# Patient Record
Sex: MALE | Race: WHITE | NOT HISPANIC OR LATINO | Employment: OTHER | URBAN - METROPOLITAN AREA
[De-identification: names, ages, dates, MRNs, and addresses within clinical notes are randomized per-mention and may not be internally consistent; named-entity substitution may affect disease eponyms.]

---

## 2019-02-05 ENCOUNTER — TELEPHONE (OUTPATIENT)
Dept: VASCULAR SURGERY | Facility: CLINIC | Age: 84
End: 2019-02-05

## 2019-02-05 NOTE — TELEPHONE ENCOUNTER
----- Message from Teresa Snell sent at 2/5/2019 10:58 AM CST -----  Contact: Annie/Tess  ..Type: Needs Medical Advice    Who Called: Katey  Best Call Back Number:   Additional Information:Pt would like to be seen By Dr Guardado to monitor his (Abdominal Aortic Aneurism and a Thoracic Aortic Aneurism, Pt just moved here from [place] and would like Dr Guardado monitor his conditions.  Please call to advise  Thanks

## 2019-02-06 ENCOUNTER — TELEPHONE (OUTPATIENT)
Dept: VASCULAR SURGERY | Facility: CLINIC | Age: 84
End: 2019-02-06

## 2019-02-06 NOTE — TELEPHONE ENCOUNTER
----- Message from Vania Staley sent at 2/6/2019  4:17 PM CST -----  Contact: Daughter Tess Armando  Patients daughter spoke to you yesterday about getting her father in to see the doctor for his aneurysms.  You told her that the doctor would need test results to prove that he has them and they found those results today.  Call back to let her know how she can get them to you and get her father scheduled.  Call Tess back at [phone].  Thanks

## 2019-02-14 ENCOUNTER — OFFICE VISIT (OUTPATIENT)
Dept: VASCULAR SURGERY | Facility: CLINIC | Age: 84
End: 2019-02-14
Payer: MEDICARE

## 2019-02-14 VITALS
DIASTOLIC BLOOD PRESSURE: 80 MMHG | BODY MASS INDEX: 28.01 KG/M2 | SYSTOLIC BLOOD PRESSURE: 149 MMHG | WEIGHT: 189.13 LBS | HEART RATE: 64 BPM | HEIGHT: 69 IN

## 2019-02-14 DIAGNOSIS — I71.20 THORACIC AORTIC ANEURYSM WITHOUT RUPTURE: ICD-10-CM

## 2019-02-14 DIAGNOSIS — I71.40 ABDOMINAL AORTIC ANEURYSM (AAA) WITHOUT RUPTURE: ICD-10-CM

## 2019-02-14 DIAGNOSIS — I71.20 THORACIC AORTIC ANEURYSM WITHOUT RUPTURE: Primary | ICD-10-CM

## 2019-02-14 PROCEDURE — 99203 OFFICE O/P NEW LOW 30 MIN: CPT | Mod: S$PBB,,, | Performed by: THORACIC SURGERY (CARDIOTHORACIC VASCULAR SURGERY)

## 2019-02-14 PROCEDURE — 99203 PR OFFICE/OUTPT VISIT, NEW, LEVL III, 30-44 MIN: ICD-10-PCS | Mod: S$PBB,,, | Performed by: THORACIC SURGERY (CARDIOTHORACIC VASCULAR SURGERY)

## 2019-02-14 PROCEDURE — 99999 PR PBB SHADOW E&M-EST. PATIENT-LVL III: CPT | Mod: PBBFAC,,, | Performed by: THORACIC SURGERY (CARDIOTHORACIC VASCULAR SURGERY)

## 2019-02-14 PROCEDURE — 99213 OFFICE O/P EST LOW 20 MIN: CPT | Mod: PBBFAC,PO | Performed by: THORACIC SURGERY (CARDIOTHORACIC VASCULAR SURGERY)

## 2019-02-14 PROCEDURE — 99999 PR PBB SHADOW E&M-EST. PATIENT-LVL III: ICD-10-PCS | Mod: PBBFAC,,, | Performed by: THORACIC SURGERY (CARDIOTHORACIC VASCULAR SURGERY)

## 2019-02-14 RX ORDER — DOXAZOSIN 2 MG/1
TABLET ORAL
Refills: 2 | COMMUNITY
Start: 2018-11-19 | End: 2020-01-08

## 2019-02-14 RX ORDER — ASCORBIC ACID 500 MG
500 TABLET ORAL DAILY
Status: ON HOLD | COMMUNITY
End: 2021-01-01

## 2019-02-14 RX ORDER — LANOLIN ALCOHOL/MO/W.PET/CERES
1000 CREAM (GRAM) TOPICAL DAILY
Status: ON HOLD | COMMUNITY
End: 2021-01-01 | Stop reason: HOSPADM

## 2019-02-14 RX ORDER — INSULIN GLARGINE 100 [IU]/ML
INJECTION, SOLUTION SUBCUTANEOUS
Refills: 2 | COMMUNITY
Start: 2018-12-24 | End: 2019-09-13 | Stop reason: CLARIF

## 2019-02-14 RX ORDER — ASPIRIN 81 MG/1
162 TABLET ORAL DAILY
COMMUNITY
End: 2019-09-13

## 2019-02-14 RX ORDER — ATORVASTATIN CALCIUM 20 MG/1
20 TABLET, FILM COATED ORAL NIGHTLY
Refills: 2 | Status: ON HOLD | COMMUNITY
Start: 2018-11-19 | End: 2021-01-01 | Stop reason: HOSPADM

## 2019-02-14 RX ORDER — CARVEDILOL 6.25 MG/1
6.25 TABLET ORAL EVERY MORNING
Status: ON HOLD | COMMUNITY
End: 2020-01-01 | Stop reason: HOSPADM

## 2019-02-15 NOTE — PROGRESS NOTES
OFFICE NOTE    HISTORY OF PRESENT ILLNESS:  This is an 89-year-old gentleman with descending   thoracic aortic and abdominal aortic aneurysms.  He had been seen at an outside   facility [place] approximately two years ago where he had a CT scan of   the chest and abdomen.  This has been followed according to the patient for many   years.  He has been totally asymptomatic.  He has mild hypertension and   diabetes.  He denies any coronary artery disease.  He has never been a smoker.    He has had no major recent surgeries.  He has had a fractured hip that was   repaired some time ago and he has recovered reasonably well from this.  He has   no other pertinent family or social history.    REVIEW OF SYSTEMS:  Fairly unremarkable for someone his age.    PHYSICAL EXAMINATION:  VITAL SIGNS:  Stable.  HEENT:  Pupils are equal, round and reactive to light.  Nose and throat are   clear.  NECK:  Supple.  CHEST:  Clear to auscultation.  HEART:  Regular rate and rhythm.  ABDOMEN:  Benign.  EXTREMITIES:  Femoral pulses are equal.  Perfusion to the legs and feet seems to   be satisfactory.    At this point, he has had scans of his chest and abdomen approximately two years   ago showing these aneurysms.  We will check a repeat CT of the chest and   abdomen to see if there is any change in aneurysm and based on this, we will   make further recommendations.      BAYLEE  dd: 02/14/2019 12:04:13 (CST)  td: 02/15/2019 00:22:20 (CST)  Doc ID   #[id number]  Job ID #[id number]    CC:

## 2019-02-18 ENCOUNTER — HOSPITAL ENCOUNTER (OUTPATIENT)
Dept: RADIOLOGY | Facility: HOSPITAL | Age: 84
Discharge: HOME | End: 2019-02-18
Attending: THORACIC SURGERY (CARDIOTHORACIC VASCULAR SURGERY)
Payer: MEDICARE

## 2019-02-18 DIAGNOSIS — I71.20 THORACIC AORTIC ANEURYSM WITHOUT RUPTURE: ICD-10-CM

## 2019-02-18 PROCEDURE — 71250 CT CHEST ABDOMEN PELVIS WITHOUT CONTRAST(XPD): ICD-10-PCS | Mod: 26,,, | Performed by: RADIOLOGY

## 2019-02-18 PROCEDURE — 74176 CT ABD & PELVIS W/O CONTRAST: CPT | Mod: TC,PO

## 2019-02-18 PROCEDURE — 74176 CT CHEST ABDOMEN PELVIS WITHOUT CONTRAST(XPD): ICD-10-PCS | Mod: 26,,, | Performed by: RADIOLOGY

## 2019-02-18 PROCEDURE — 71250 CT THORAX DX C-: CPT | Mod: 26,,, | Performed by: RADIOLOGY

## 2019-02-18 PROCEDURE — 71250 CT THORAX DX C-: CPT | Mod: TC,PO

## 2019-02-18 PROCEDURE — 74176 CT ABD & PELVIS W/O CONTRAST: CPT | Mod: 26,,, | Performed by: RADIOLOGY

## 2019-06-13 ENCOUNTER — TELEPHONE (OUTPATIENT)
Dept: VASCULAR SURGERY | Facility: CLINIC | Age: 84
End: 2019-06-13

## 2019-06-13 NOTE — TELEPHONE ENCOUNTER
----- Message from Darlyn Elkins sent at 6/13/2019  1:44 PM CDT -----  Contact: Tess Armando (Daughter)  Tess Tr (Daughter) states pt was seen 2/14/19 and they are wanting to know does he need to come back in for any follow up appointments....[phone]

## 2019-06-19 DIAGNOSIS — I71.20 THORACIC AORTIC ANEURYSM WITHOUT RUPTURE: Primary | ICD-10-CM

## 2019-06-28 ENCOUNTER — TELEPHONE (OUTPATIENT)
Dept: VASCULAR SURGERY | Facility: CLINIC | Age: 84
End: 2019-06-28

## 2019-06-28 NOTE — TELEPHONE ENCOUNTER
----- Message from Brooklyn Uriarte sent at 6/28/2019  2:17 PM CDT -----  Contact: Duong Armando [phone]  Please call her to schedule the CT. Thank you!

## 2019-07-03 ENCOUNTER — HOSPITAL ENCOUNTER (OUTPATIENT)
Dept: RADIOLOGY | Facility: HOSPITAL | Age: 84
Discharge: HOME | End: 2019-07-03
Attending: THORACIC SURGERY (CARDIOTHORACIC VASCULAR SURGERY)
Payer: MEDICARE

## 2019-07-03 DIAGNOSIS — I71.20 THORACIC AORTIC ANEURYSM WITHOUT RUPTURE: ICD-10-CM

## 2019-07-03 PROCEDURE — 71250 CT THORAX DX C-: CPT | Mod: TC,PO

## 2019-07-03 PROCEDURE — 71250 CT CHEST ABDOMEN PELVIS WITHOUT CONTRAST(XPD): ICD-10-PCS | Mod: 26,,, | Performed by: RADIOLOGY

## 2019-07-03 PROCEDURE — 74176 CT ABD & PELVIS W/O CONTRAST: CPT | Mod: TC,PO

## 2019-07-03 PROCEDURE — 71250 CT THORAX DX C-: CPT | Mod: 26,,, | Performed by: RADIOLOGY

## 2019-07-03 PROCEDURE — 74176 CT CHEST ABDOMEN PELVIS WITHOUT CONTRAST(XPD): ICD-10-PCS | Mod: 26,,, | Performed by: RADIOLOGY

## 2019-07-03 PROCEDURE — 74176 CT ABD & PELVIS W/O CONTRAST: CPT | Mod: 26,,, | Performed by: RADIOLOGY

## 2019-07-04 ENCOUNTER — PATIENT MESSAGE (OUTPATIENT)
Dept: VASCULAR SURGERY | Facility: CLINIC | Age: 84
End: 2019-07-04

## 2019-07-10 ENCOUNTER — PATIENT MESSAGE (OUTPATIENT)
Dept: VASCULAR SURGERY | Facility: CLINIC | Age: 84
End: 2019-07-10

## 2019-07-12 ENCOUNTER — TELEPHONE (OUTPATIENT)
Dept: VASCULAR SURGERY | Facility: CLINIC | Age: 84
End: 2019-07-12

## 2019-07-24 ENCOUNTER — PATIENT MESSAGE (OUTPATIENT)
Dept: VASCULAR SURGERY | Facility: CLINIC | Age: 84
End: 2019-07-24

## 2019-08-19 ENCOUNTER — LAB VISIT (OUTPATIENT)
Dept: LAB | Facility: HOSPITAL | Age: 84
End: 2019-08-19
Attending: INTERNAL MEDICINE
Payer: MEDICARE

## 2019-08-19 DIAGNOSIS — D63.1 ANEMIA OF RENAL DISEASE: ICD-10-CM

## 2019-08-19 DIAGNOSIS — N18.9 ANEMIA OF RENAL DISEASE: ICD-10-CM

## 2019-08-19 LAB
ALBUMIN SERPL BCP-MCNC: 4.4 G/DL (ref 3.5–5.2)
ALP SERPL-CCNC: 81 U/L (ref 38–145)
ALT SERPL W/O P-5'-P-CCNC: 13 U/L (ref 10–44)
ANION GAP SERPL CALC-SCNC: 7 MMOL/L (ref 8–16)
AST SERPL-CCNC: 28 U/L (ref 17–59)
BASOPHILS # BLD AUTO: 0.01 K/UL (ref 0–0.2)
BASOPHILS NFR BLD: 0.3 % (ref 0–1.9)
BILIRUB SERPL-MCNC: 0.6 MG/DL (ref 0.2–1.3)
BUN SERPL-MCNC: 32 MG/DL (ref 9–21)
CALCIUM SERPL-MCNC: 9.9 MG/DL (ref 8.4–10.2)
CHLORIDE SERPL-SCNC: 105 MMOL/L (ref 95–110)
CO2 SERPL-SCNC: 28 MMOL/L (ref 22–31)
CREAT SERPL-MCNC: 1.78 MG/DL (ref 0.5–1.4)
DIFFERENTIAL METHOD: ABNORMAL
EOSINOPHIL # BLD AUTO: 0 K/UL (ref 0–0.5)
EOSINOPHIL NFR BLD: 0 % (ref 0–8)
ERYTHROCYTE [DISTWIDTH] IN BLOOD BY AUTOMATED COUNT: 17.1 % (ref 11.5–14.5)
EST. GFR  (AFRICAN AMERICAN): 38 ML/MIN/1.73 M^2
EST. GFR  (NON AFRICAN AMERICAN): 33 ML/MIN/1.73 M^2
FERRITIN SERPL-MCNC: 429 NG/ML (ref 18–464)
GLUCOSE SERPL-MCNC: 113 MG/DL (ref 70–110)
HCT VFR BLD AUTO: 28.3 % (ref 40–54)
HGB BLD-MCNC: 8.6 G/DL (ref 14–18)
IMM GRANULOCYTES # BLD AUTO: 0.02 K/UL (ref 0–0.04)
IMM GRANULOCYTES NFR BLD AUTO: 0.6 % (ref 0–0.5)
IRON SATN MFR SERPL: 13 % (ref 20–50)
IRON SERPL-MCNC: 35 UG/DL (ref 45–160)
LYMPHOCYTES # BLD AUTO: 1.6 K/UL (ref 1–4.8)
LYMPHOCYTES NFR BLD: 47.7 % (ref 18–48)
MCH RBC QN AUTO: 28.4 PG (ref 27–31)
MCHC RBC AUTO-ENTMCNC: 30.4 G/DL (ref 32–36)
MCV RBC AUTO: 93 FL (ref 82–98)
MONOCYTES # BLD AUTO: 0.3 K/UL (ref 0.3–1)
MONOCYTES NFR BLD: 9.7 % (ref 4–15)
NEUTROPHILS # BLD AUTO: 1.4 K/UL (ref 1.8–7.7)
NEUTROPHILS NFR BLD: 41.7 % (ref 38–73)
NRBC BLD-RTO: 0 /100 WBC
PLATELET # BLD AUTO: 86 K/UL (ref 150–350)
PMV BLD AUTO: 10.1 FL (ref 9.2–12.9)
POTASSIUM SERPL-SCNC: 5.1 MMOL/L (ref 3.5–5.1)
PROT SERPL-MCNC: 8.1 G/DL (ref 6–8.4)
RBC # BLD AUTO: 3.03 M/UL (ref 4.6–6.2)
RETICS/RBC NFR AUTO: 1.9 % (ref 0.4–2)
SODIUM SERPL-SCNC: 140 MMOL/L (ref 136–145)
TOTAL IRON BINDING CAPACITY: 268 UG/DL (ref 261–462)
WBC # BLD AUTO: 3.31 K/UL (ref 3.9–12.7)

## 2019-08-19 PROCEDURE — 83540 ASSAY OF IRON: CPT | Mod: PN

## 2019-08-19 PROCEDURE — 80053 COMPREHEN METABOLIC PANEL: CPT

## 2019-08-19 PROCEDURE — 85045 AUTOMATED RETICULOCYTE COUNT: CPT | Mod: PN

## 2019-08-19 PROCEDURE — 85025 COMPLETE CBC W/AUTO DIFF WBC: CPT | Mod: PN

## 2019-08-19 PROCEDURE — 82728 ASSAY OF FERRITIN: CPT

## 2019-08-19 PROCEDURE — 36415 COLL VENOUS BLD VENIPUNCTURE: CPT | Mod: PN

## 2019-08-19 PROCEDURE — 85025 COMPLETE CBC W/AUTO DIFF WBC: CPT

## 2019-08-19 PROCEDURE — 85045 AUTOMATED RETICULOCYTE COUNT: CPT

## 2019-08-19 PROCEDURE — 83540 ASSAY OF IRON: CPT

## 2019-08-19 PROCEDURE — 82668 ASSAY OF ERYTHROPOIETIN: CPT

## 2019-08-19 PROCEDURE — 80053 COMPREHEN METABOLIC PANEL: CPT | Mod: PN

## 2019-08-19 PROCEDURE — 82728 ASSAY OF FERRITIN: CPT | Mod: PN

## 2019-08-23 LAB — EPO SERPL-ACNC: 34.7 MIU/ML (ref 2.6–18.5)

## 2019-08-29 ENCOUNTER — OFFICE VISIT (OUTPATIENT)
Dept: OPHTHALMOLOGY | Facility: CLINIC | Age: 84
End: 2019-08-29
Payer: MEDICARE

## 2019-08-29 DIAGNOSIS — E11.9 DIABETES MELLITUS TYPE 2 WITHOUT RETINOPATHY: ICD-10-CM

## 2019-08-29 DIAGNOSIS — H52.7 REFRACTIVE ERROR: ICD-10-CM

## 2019-08-29 DIAGNOSIS — H25.11 NUCLEAR SCLEROSIS, RIGHT: Primary | ICD-10-CM

## 2019-08-29 DIAGNOSIS — Z96.1 PSEUDOPHAKIA, LEFT EYE: ICD-10-CM

## 2019-08-29 DIAGNOSIS — H18.453 SALZMANN'S NODULAR DEGENERATION OF CORNEAS OF BOTH EYES: ICD-10-CM

## 2019-08-29 PROCEDURE — 92015 DETERMINE REFRACTIVE STATE: CPT | Mod: ,,, | Performed by: OPHTHALMOLOGY

## 2019-08-29 PROCEDURE — 99999 PR PBB SHADOW E&M-EST. PATIENT-LVL III: CPT | Mod: PBBFAC,,, | Performed by: OPHTHALMOLOGY

## 2019-08-29 PROCEDURE — 92015 PR REFRACTION: ICD-10-PCS | Mod: ,,, | Performed by: OPHTHALMOLOGY

## 2019-08-29 PROCEDURE — 99213 OFFICE O/P EST LOW 20 MIN: CPT | Mod: PBBFAC,PO | Performed by: OPHTHALMOLOGY

## 2019-08-29 PROCEDURE — 92004 COMPRE OPH EXAM NEW PT 1/>: CPT | Mod: S$PBB,,, | Performed by: OPHTHALMOLOGY

## 2019-08-29 PROCEDURE — 92004 PR EYE EXAM, NEW PATIENT,COMPREHESV: ICD-10-PCS | Mod: S$PBB,,, | Performed by: OPHTHALMOLOGY

## 2019-08-29 PROCEDURE — 99999 PR PBB SHADOW E&M-EST. PATIENT-LVL III: ICD-10-PCS | Mod: PBBFAC,,, | Performed by: OPHTHALMOLOGY

## 2019-08-29 RX ORDER — INSULIN GLARGINE 300 [IU]/ML
18 INJECTION, SOLUTION SUBCUTANEOUS NIGHTLY
COMMUNITY
Start: 2019-08-01 | End: 2020-01-01 | Stop reason: SDUPTHER

## 2019-08-29 NOTE — PROGRESS NOTES
HPI     DLE FEB [facility] [place]  Last year    Here to establish care states he has dry eyes feels like he has sand in   them especially when he is on the computer.  Using Thera tears OU PRN also   has a sample of Lumify he received at [facility]. Pt has Diabetes 20 years BG   reasonably controlled.     No results found for: HGBA1C  No results found for: LABA1C    CE OD 2014 Left Eye/ Pt does not drive.     Agree with above. Uses Thera-tears (bottle not PF) as needed, 4-5 times   per day. Has been going on a couple years, worse since the first of this   year.     Last edited by Pat Foster MD on 8/29/2019  9:04 AM.   (History)        ROS     Positive for: Neurological (neuropathy), Genitourinary (Cardura; CKD3),   Musculoskeletal (s/p hip replacement 2018), HENT ([place], hearing aids),   Endocrine (DM diagnosed around age 70), Cardiovascular (HTN - controlled   with meds per pt; thoracic and abdominal aortic aneurysms), Eyes (CE OD   2014), Heme/Lymph (ASA; anemia)    Negative for: Respiratory (denies asthma/orthopnea; recent SOB with   exertion/walking)    Last edited by Pat Foster MD on 8/29/2019  9:09 AM.   (History)        Assessment /Plan     For exam results, see Encounter Report.    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 Dilates 4.8 mm with 1%. Benefit likely does not outweigh risk at this point in 89 y/o pt. Would need Atropine 3 days prior due to Cardura. F/u 1 year.     Stable    Switch artificial tears to non-preserved formula. Add nighttime lubricating ointment or liqui-gel drop due to mild lagophthalmos OD. Warm compresses, lid hygiene. Handout given. Consider Schirmer testing next visit.    No retionpathy. F/u 1 year.    MRx given.

## 2019-09-11 PROBLEM — D61.818 PANCYTOPENIA: Status: ACTIVE | Noted: 2019-09-11

## 2019-10-09 ENCOUNTER — INFUSION (OUTPATIENT)
Dept: INFUSION THERAPY | Facility: HOSPITAL | Age: 84
End: 2019-10-09
Attending: INTERNAL MEDICINE
Payer: MEDICARE

## 2019-10-09 VITALS — SYSTOLIC BLOOD PRESSURE: 150 MMHG | DIASTOLIC BLOOD PRESSURE: 87 MMHG | HEART RATE: 72 BPM | RESPIRATION RATE: 14 BRPM

## 2019-10-09 DIAGNOSIS — D63.1 ANEMIA OF RENAL DISEASE: Primary | ICD-10-CM

## 2019-10-09 DIAGNOSIS — N18.9 ANEMIA OF RENAL DISEASE: Primary | ICD-10-CM

## 2019-10-09 PROCEDURE — 63600175 PHARM REV CODE 636 W HCPCS: Mod: PN | Performed by: INTERNAL MEDICINE

## 2019-10-09 PROCEDURE — 96372 THER/PROPH/DIAG INJ SC/IM: CPT | Mod: PN

## 2019-10-09 RX ADMIN — EPOETIN ALFA-EPBX 40000 UNITS: 40000 INJECTION, SOLUTION INTRAVENOUS; SUBCUTANEOUS at 02:10

## 2019-10-16 ENCOUNTER — CLINICAL SUPPORT (OUTPATIENT)
Dept: FAMILY MEDICINE | Facility: CLINIC | Age: 84
End: 2019-10-16
Payer: MEDICARE

## 2019-10-16 ENCOUNTER — PATIENT MESSAGE (OUTPATIENT)
Dept: VASCULAR SURGERY | Facility: CLINIC | Age: 84
End: 2019-10-16

## 2019-10-16 ENCOUNTER — INFUSION (OUTPATIENT)
Dept: INFUSION THERAPY | Facility: HOSPITAL | Age: 84
End: 2019-10-16
Attending: INTERNAL MEDICINE
Payer: MEDICARE

## 2019-10-16 VITALS
DIASTOLIC BLOOD PRESSURE: 69 MMHG | WEIGHT: 173.25 LBS | RESPIRATION RATE: 14 BRPM | BODY MASS INDEX: 25.58 KG/M2 | SYSTOLIC BLOOD PRESSURE: 144 MMHG | HEART RATE: 75 BPM

## 2019-10-16 DIAGNOSIS — N18.9 ANEMIA OF RENAL DISEASE: Primary | ICD-10-CM

## 2019-10-16 DIAGNOSIS — Z23 NEED FOR PNEUMOCOCCAL VACCINATION: Primary | ICD-10-CM

## 2019-10-16 DIAGNOSIS — I71.20 THORACIC AORTIC ANEURYSM WITHOUT RUPTURE: Primary | ICD-10-CM

## 2019-10-16 DIAGNOSIS — D63.1 ANEMIA OF RENAL DISEASE: Primary | ICD-10-CM

## 2019-10-16 PROCEDURE — 90662 IIV NO PRSV INCREASED AG IM: CPT | Mod: PBBFAC,PO

## 2019-10-16 PROCEDURE — 96372 THER/PROPH/DIAG INJ SC/IM: CPT | Mod: PN

## 2019-10-16 PROCEDURE — 63600175 PHARM REV CODE 636 W HCPCS: Mod: PN | Performed by: INTERNAL MEDICINE

## 2019-10-16 RX ADMIN — EPOETIN ALFA-EPBX 40000 UNITS: 40000 INJECTION, SOLUTION INTRAVENOUS; SUBCUTANEOUS at 03:10

## 2019-10-23 ENCOUNTER — INFUSION (OUTPATIENT)
Dept: INFUSION THERAPY | Facility: HOSPITAL | Age: 84
End: 2019-10-23
Attending: INTERNAL MEDICINE
Payer: MEDICARE

## 2019-10-23 VITALS — HEART RATE: 69 BPM | RESPIRATION RATE: 20 BRPM | DIASTOLIC BLOOD PRESSURE: 63 MMHG | SYSTOLIC BLOOD PRESSURE: 119 MMHG

## 2019-10-23 DIAGNOSIS — D63.1 ANEMIA OF RENAL DISEASE: Primary | ICD-10-CM

## 2019-10-23 DIAGNOSIS — N18.9 ANEMIA OF RENAL DISEASE: Primary | ICD-10-CM

## 2019-10-23 PROCEDURE — 63600175 PHARM REV CODE 636 W HCPCS: Mod: PN | Performed by: INTERNAL MEDICINE

## 2019-10-23 PROCEDURE — 96372 THER/PROPH/DIAG INJ SC/IM: CPT | Mod: PN

## 2019-10-23 RX ADMIN — EPOETIN ALFA-EPBX 40000 UNITS: 40000 INJECTION, SOLUTION INTRAVENOUS; SUBCUTANEOUS at 01:10

## 2019-10-30 ENCOUNTER — INFUSION (OUTPATIENT)
Dept: INFUSION THERAPY | Facility: HOSPITAL | Age: 84
End: 2019-10-30
Attending: INTERNAL MEDICINE
Payer: MEDICARE

## 2019-10-30 VITALS
RESPIRATION RATE: 18 BRPM | TEMPERATURE: 98 F | DIASTOLIC BLOOD PRESSURE: 81 MMHG | HEART RATE: 75 BPM | SYSTOLIC BLOOD PRESSURE: 172 MMHG

## 2019-10-30 DIAGNOSIS — D63.1 ANEMIA OF RENAL DISEASE: Primary | ICD-10-CM

## 2019-10-30 DIAGNOSIS — N18.9 ANEMIA OF RENAL DISEASE: Primary | ICD-10-CM

## 2019-10-30 PROCEDURE — 63600175 PHARM REV CODE 636 W HCPCS: Mod: PN | Performed by: INTERNAL MEDICINE

## 2019-10-30 PROCEDURE — 96372 THER/PROPH/DIAG INJ SC/IM: CPT | Mod: PN

## 2019-10-30 RX ADMIN — EPOETIN ALFA-EPBX 40000 UNITS: 40000 INJECTION, SOLUTION INTRAVENOUS; SUBCUTANEOUS at 02:10

## 2019-11-06 ENCOUNTER — INFUSION (OUTPATIENT)
Dept: INFUSION THERAPY | Facility: HOSPITAL | Age: 84
End: 2019-11-06
Attending: INTERNAL MEDICINE
Payer: MEDICARE

## 2019-11-06 VITALS
TEMPERATURE: 99 F | DIASTOLIC BLOOD PRESSURE: 74 MMHG | SYSTOLIC BLOOD PRESSURE: 163 MMHG | WEIGHT: 173.06 LBS | BODY MASS INDEX: 25.56 KG/M2 | RESPIRATION RATE: 18 BRPM | HEART RATE: 73 BPM

## 2019-11-06 DIAGNOSIS — N18.9 ANEMIA OF RENAL DISEASE: Primary | ICD-10-CM

## 2019-11-06 DIAGNOSIS — D63.1 ANEMIA OF RENAL DISEASE: Primary | ICD-10-CM

## 2019-11-06 PROCEDURE — 63600175 PHARM REV CODE 636 W HCPCS: Mod: PN | Performed by: INTERNAL MEDICINE

## 2019-11-06 PROCEDURE — 96372 THER/PROPH/DIAG INJ SC/IM: CPT | Mod: PN

## 2019-11-06 RX ADMIN — EPOETIN ALFA-EPBX 40000 UNITS: 40000 INJECTION, SOLUTION INTRAVENOUS; SUBCUTANEOUS at 03:11

## 2019-11-07 ENCOUNTER — HOSPITAL ENCOUNTER (OUTPATIENT)
Dept: RADIOLOGY | Facility: HOSPITAL | Age: 84
Discharge: HOME | End: 2019-11-07
Attending: THORACIC SURGERY (CARDIOTHORACIC VASCULAR SURGERY)
Payer: MEDICARE

## 2019-11-07 DIAGNOSIS — I71.20 THORACIC AORTIC ANEURYSM WITHOUT RUPTURE: ICD-10-CM

## 2019-11-07 PROCEDURE — 74176 CT CHEST ABDOMEN PELVIS WITHOUT CONTRAST(XPD): ICD-10-PCS | Mod: 26,,, | Performed by: RADIOLOGY

## 2019-11-07 PROCEDURE — 71250 CT CHEST ABDOMEN PELVIS WITHOUT CONTRAST(XPD): ICD-10-PCS | Mod: 26,,, | Performed by: RADIOLOGY

## 2019-11-07 PROCEDURE — 74176 CT ABD & PELVIS W/O CONTRAST: CPT | Mod: TC,PO

## 2019-11-07 PROCEDURE — 71250 CT THORAX DX C-: CPT | Mod: 26,,, | Performed by: RADIOLOGY

## 2019-11-07 PROCEDURE — 71250 CT THORAX DX C-: CPT | Mod: TC,PO

## 2019-11-07 PROCEDURE — 74176 CT ABD & PELVIS W/O CONTRAST: CPT | Mod: 26,,, | Performed by: RADIOLOGY

## 2019-11-13 ENCOUNTER — INFUSION (OUTPATIENT)
Dept: INFUSION THERAPY | Facility: HOSPITAL | Age: 84
End: 2019-11-13
Attending: INTERNAL MEDICINE
Payer: MEDICARE

## 2019-11-13 VITALS
HEART RATE: 69 BPM | SYSTOLIC BLOOD PRESSURE: 177 MMHG | RESPIRATION RATE: 20 BRPM | DIASTOLIC BLOOD PRESSURE: 76 MMHG | TEMPERATURE: 99 F

## 2019-11-13 DIAGNOSIS — N18.9 ANEMIA OF RENAL DISEASE: Primary | ICD-10-CM

## 2019-11-13 DIAGNOSIS — D63.1 ANEMIA OF RENAL DISEASE: Primary | ICD-10-CM

## 2019-11-13 PROCEDURE — 96372 THER/PROPH/DIAG INJ SC/IM: CPT | Mod: PN

## 2019-11-13 PROCEDURE — 63600175 PHARM REV CODE 636 W HCPCS: Mod: PN | Performed by: INTERNAL MEDICINE

## 2019-11-13 RX ADMIN — EPOETIN ALFA-EPBX 40000 UNITS: 40000 INJECTION, SOLUTION INTRAVENOUS; SUBCUTANEOUS at 03:11

## 2019-11-20 ENCOUNTER — INFUSION (OUTPATIENT)
Dept: INFUSION THERAPY | Facility: HOSPITAL | Age: 84
End: 2019-11-20
Attending: INTERNAL MEDICINE
Payer: MEDICARE

## 2019-11-20 VITALS
RESPIRATION RATE: 18 BRPM | SYSTOLIC BLOOD PRESSURE: 173 MMHG | HEART RATE: 71 BPM | DIASTOLIC BLOOD PRESSURE: 87 MMHG | TEMPERATURE: 98 F

## 2019-11-20 DIAGNOSIS — N18.9 ANEMIA OF RENAL DISEASE: Primary | ICD-10-CM

## 2019-11-20 DIAGNOSIS — D63.1 ANEMIA OF RENAL DISEASE: Primary | ICD-10-CM

## 2019-11-20 PROCEDURE — 63600175 PHARM REV CODE 636 W HCPCS: Mod: PN | Performed by: INTERNAL MEDICINE

## 2019-11-20 PROCEDURE — 96372 THER/PROPH/DIAG INJ SC/IM: CPT | Mod: PN

## 2019-11-20 RX ADMIN — EPOETIN ALFA-EPBX 40000 UNITS: 40000 INJECTION, SOLUTION INTRAVENOUS; SUBCUTANEOUS at 11:11

## 2019-11-27 ENCOUNTER — INFUSION (OUTPATIENT)
Dept: INFUSION THERAPY | Facility: HOSPITAL | Age: 84
End: 2019-11-27
Attending: INTERNAL MEDICINE
Payer: MEDICARE

## 2019-11-27 VITALS
TEMPERATURE: 98 F | SYSTOLIC BLOOD PRESSURE: 139 MMHG | RESPIRATION RATE: 18 BRPM | DIASTOLIC BLOOD PRESSURE: 63 MMHG | WEIGHT: 171.94 LBS | HEART RATE: 67 BPM | BODY MASS INDEX: 25.39 KG/M2

## 2019-11-27 DIAGNOSIS — N18.9 ANEMIA OF RENAL DISEASE: Primary | ICD-10-CM

## 2019-11-27 DIAGNOSIS — D63.1 ANEMIA OF RENAL DISEASE: Primary | ICD-10-CM

## 2019-11-27 PROCEDURE — 96372 THER/PROPH/DIAG INJ SC/IM: CPT | Mod: PN

## 2019-11-27 PROCEDURE — 63600175 PHARM REV CODE 636 W HCPCS: Mod: PN | Performed by: INTERNAL MEDICINE

## 2019-11-27 RX ADMIN — EPOETIN ALFA-EPBX 40000 UNITS: 40000 INJECTION, SOLUTION INTRAVENOUS; SUBCUTANEOUS at 03:11

## 2019-12-03 PROBLEM — E11.69 DYSLIPIDEMIA ASSOCIATED WITH TYPE 2 DIABETES MELLITUS: Status: ACTIVE | Noted: 2019-12-03

## 2019-12-03 PROBLEM — N18.30 BENIGN HYPERTENSION WITH CKD (CHRONIC KIDNEY DISEASE) STAGE III: Status: ACTIVE | Noted: 2019-12-03

## 2019-12-03 PROBLEM — E78.5 DYSLIPIDEMIA ASSOCIATED WITH TYPE 2 DIABETES MELLITUS: Status: ACTIVE | Noted: 2019-12-03

## 2019-12-03 PROBLEM — E11.22 CONTROLLED TYPE 2 DIABETES MELLITUS WITH STAGE 3 CHRONIC KIDNEY DISEASE, WITH LONG-TERM CURRENT USE OF INSULIN: Status: ACTIVE | Noted: 2019-12-03

## 2019-12-03 PROBLEM — I12.9 BENIGN HYPERTENSION WITH CKD (CHRONIC KIDNEY DISEASE) STAGE III: Status: ACTIVE | Noted: 2019-12-03

## 2019-12-03 PROBLEM — N40.0 BENIGN PROSTATIC HYPERPLASIA: Status: ACTIVE | Noted: 2019-12-03

## 2019-12-03 PROBLEM — Z79.4 CONTROLLED TYPE 2 DIABETES MELLITUS WITH STAGE 3 CHRONIC KIDNEY DISEASE, WITH LONG-TERM CURRENT USE OF INSULIN: Status: ACTIVE | Noted: 2019-12-03

## 2019-12-03 PROBLEM — N18.30 CONTROLLED TYPE 2 DIABETES MELLITUS WITH STAGE 3 CHRONIC KIDNEY DISEASE, WITH LONG-TERM CURRENT USE OF INSULIN: Status: ACTIVE | Noted: 2019-12-03

## 2019-12-04 ENCOUNTER — INFUSION (OUTPATIENT)
Dept: INFUSION THERAPY | Facility: HOSPITAL | Age: 84
End: 2019-12-04
Attending: INTERNAL MEDICINE
Payer: MEDICARE

## 2019-12-04 VITALS
SYSTOLIC BLOOD PRESSURE: 135 MMHG | TEMPERATURE: 98 F | DIASTOLIC BLOOD PRESSURE: 82 MMHG | RESPIRATION RATE: 18 BRPM | HEART RATE: 62 BPM

## 2019-12-04 DIAGNOSIS — N18.9 ANEMIA OF RENAL DISEASE: Primary | ICD-10-CM

## 2019-12-04 DIAGNOSIS — D63.1 ANEMIA OF RENAL DISEASE: Primary | ICD-10-CM

## 2019-12-04 PROCEDURE — 63600175 PHARM REV CODE 636 W HCPCS: Mod: PN | Performed by: INTERNAL MEDICINE

## 2019-12-04 PROCEDURE — 96372 THER/PROPH/DIAG INJ SC/IM: CPT | Mod: PN

## 2019-12-04 RX ADMIN — EPOETIN ALFA-EPBX 40000 UNITS: 40000 INJECTION, SOLUTION INTRAVENOUS; SUBCUTANEOUS at 03:12

## 2019-12-11 ENCOUNTER — INFUSION (OUTPATIENT)
Dept: INFUSION THERAPY | Facility: HOSPITAL | Age: 84
End: 2019-12-11
Attending: INTERNAL MEDICINE
Payer: MEDICARE

## 2019-12-11 VITALS
DIASTOLIC BLOOD PRESSURE: 72 MMHG | HEART RATE: 64 BPM | BODY MASS INDEX: 25.4 KG/M2 | WEIGHT: 172 LBS | SYSTOLIC BLOOD PRESSURE: 154 MMHG | RESPIRATION RATE: 18 BRPM

## 2019-12-11 DIAGNOSIS — D63.1 ANEMIA OF RENAL DISEASE: Primary | ICD-10-CM

## 2019-12-11 DIAGNOSIS — N18.9 ANEMIA OF RENAL DISEASE: Primary | ICD-10-CM

## 2019-12-11 PROCEDURE — 63600175 PHARM REV CODE 636 W HCPCS: Mod: PN | Performed by: INTERNAL MEDICINE

## 2019-12-11 PROCEDURE — 96372 THER/PROPH/DIAG INJ SC/IM: CPT | Mod: PN

## 2019-12-11 RX ADMIN — EPOETIN ALFA-EPBX 40000 UNITS: 40000 INJECTION, SOLUTION INTRAVENOUS; SUBCUTANEOUS at 03:12

## 2019-12-18 ENCOUNTER — INFUSION (OUTPATIENT)
Dept: INFUSION THERAPY | Facility: HOSPITAL | Age: 84
End: 2019-12-18
Attending: INTERNAL MEDICINE
Payer: MEDICARE

## 2019-12-18 ENCOUNTER — TELEPHONE (OUTPATIENT)
Dept: OPHTHALMOLOGY | Facility: CLINIC | Age: 84
End: 2019-12-18

## 2019-12-18 DIAGNOSIS — N18.9 ANEMIA OF RENAL DISEASE: Primary | ICD-10-CM

## 2019-12-18 DIAGNOSIS — D63.1 ANEMIA OF RENAL DISEASE: Primary | ICD-10-CM

## 2019-12-18 PROCEDURE — 96372 THER/PROPH/DIAG INJ SC/IM: CPT | Mod: PN

## 2019-12-18 PROCEDURE — 63600175 PHARM REV CODE 636 W HCPCS: Mod: PN | Performed by: INTERNAL MEDICINE

## 2019-12-18 RX ADMIN — EPOETIN ALFA-EPBX 40000 UNITS: 40000 INJECTION, SOLUTION INTRAVENOUS; SUBCUTANEOUS at 03:12

## 2019-12-23 ENCOUNTER — LAB VISIT (OUTPATIENT)
Dept: LAB | Facility: HOSPITAL | Age: 84
End: 2019-12-23
Attending: INTERNAL MEDICINE
Payer: MEDICARE

## 2019-12-23 DIAGNOSIS — D63.1 ANEMIA OF RENAL DISEASE: ICD-10-CM

## 2019-12-23 DIAGNOSIS — N18.9 ANEMIA OF RENAL DISEASE: ICD-10-CM

## 2019-12-23 LAB
BASOPHILS # BLD AUTO: 0.01 K/UL (ref 0–0.2)
BASOPHILS NFR BLD: 0.3 % (ref 0–1.9)
DIFFERENTIAL METHOD: ABNORMAL
EOSINOPHIL # BLD AUTO: 0 K/UL (ref 0–0.5)
EOSINOPHIL NFR BLD: 0 % (ref 0–8)
ERYTHROCYTE [DISTWIDTH] IN BLOOD BY AUTOMATED COUNT: 18 % (ref 11.5–14.5)
HCT VFR BLD AUTO: 36.4 % (ref 40–54)
HGB BLD-MCNC: 10.6 G/DL (ref 14–18)
IMM GRANULOCYTES # BLD AUTO: 0.04 K/UL (ref 0–0.04)
IMM GRANULOCYTES NFR BLD AUTO: 1.1 % (ref 0–0.5)
LYMPHOCYTES # BLD AUTO: 1.5 K/UL (ref 1–4.8)
LYMPHOCYTES NFR BLD: 43 % (ref 18–48)
MCH RBC QN AUTO: 26.5 PG (ref 27–31)
MCHC RBC AUTO-ENTMCNC: 29.1 G/DL (ref 32–36)
MCV RBC AUTO: 91 FL (ref 82–98)
MONOCYTES # BLD AUTO: 0.4 K/UL (ref 0.3–1)
MONOCYTES NFR BLD: 12.3 % (ref 4–15)
NEUTROPHILS # BLD AUTO: 1.5 K/UL (ref 1.8–7.7)
NEUTROPHILS NFR BLD: 43.3 % (ref 38–73)
NRBC BLD-RTO: 0 /100 WBC
PLATELET # BLD AUTO: 97 K/UL (ref 150–350)
PMV BLD AUTO: 10 FL (ref 9.2–12.9)
RBC # BLD AUTO: 4 M/UL (ref 4.6–6.2)
WBC # BLD AUTO: 3.51 K/UL (ref 3.9–12.7)

## 2019-12-23 PROCEDURE — 36415 COLL VENOUS BLD VENIPUNCTURE: CPT | Mod: PN

## 2019-12-23 PROCEDURE — 85025 COMPLETE CBC W/AUTO DIFF WBC: CPT | Mod: PN

## 2019-12-23 PROCEDURE — 85025 COMPLETE CBC W/AUTO DIFF WBC: CPT

## 2019-12-26 ENCOUNTER — INFUSION (OUTPATIENT)
Dept: INFUSION THERAPY | Facility: HOSPITAL | Age: 84
End: 2019-12-26
Attending: INTERNAL MEDICINE
Payer: MEDICARE

## 2019-12-26 VITALS
RESPIRATION RATE: 20 BRPM | WEIGHT: 171.94 LBS | SYSTOLIC BLOOD PRESSURE: 139 MMHG | DIASTOLIC BLOOD PRESSURE: 65 MMHG | HEART RATE: 61 BPM | BODY MASS INDEX: 25.39 KG/M2

## 2019-12-26 DIAGNOSIS — N18.9 ANEMIA OF RENAL DISEASE: Primary | ICD-10-CM

## 2019-12-26 DIAGNOSIS — D63.1 ANEMIA OF RENAL DISEASE: Primary | ICD-10-CM

## 2019-12-26 PROCEDURE — 96372 THER/PROPH/DIAG INJ SC/IM: CPT | Mod: PN

## 2019-12-26 PROCEDURE — 63600175 PHARM REV CODE 636 W HCPCS: Mod: PN | Performed by: INTERNAL MEDICINE

## 2019-12-26 RX ADMIN — EPOETIN ALFA-EPBX 40000 UNITS: 40000 INJECTION, SOLUTION INTRAVENOUS; SUBCUTANEOUS at 03:12

## 2019-12-30 ENCOUNTER — LAB VISIT (OUTPATIENT)
Dept: LAB | Facility: HOSPITAL | Age: 84
End: 2019-12-30
Attending: INTERNAL MEDICINE
Payer: MEDICARE

## 2019-12-30 DIAGNOSIS — D63.1 ANEMIA OF RENAL DISEASE: ICD-10-CM

## 2019-12-30 DIAGNOSIS — N18.9 ANEMIA OF RENAL DISEASE: ICD-10-CM

## 2019-12-30 LAB
BASOPHILS # BLD AUTO: 0.01 K/UL (ref 0–0.2)
BASOPHILS NFR BLD: 0.3 % (ref 0–1.9)
DIFFERENTIAL METHOD: ABNORMAL
EOSINOPHIL # BLD AUTO: 0 K/UL (ref 0–0.5)
EOSINOPHIL NFR BLD: 0 % (ref 0–8)
ERYTHROCYTE [DISTWIDTH] IN BLOOD BY AUTOMATED COUNT: 17.6 % (ref 11.5–14.5)
HCT VFR BLD AUTO: 35.6 % (ref 40–54)
HGB BLD-MCNC: 10.2 G/DL (ref 14–18)
IMM GRANULOCYTES # BLD AUTO: 0.04 K/UL (ref 0–0.04)
IMM GRANULOCYTES NFR BLD AUTO: 1.1 % (ref 0–0.5)
LYMPHOCYTES # BLD AUTO: 1.7 K/UL (ref 1–4.8)
LYMPHOCYTES NFR BLD: 47.2 % (ref 18–48)
MCH RBC QN AUTO: 25.5 PG (ref 27–31)
MCHC RBC AUTO-ENTMCNC: 28.7 G/DL (ref 32–36)
MCV RBC AUTO: 89 FL (ref 82–98)
MONOCYTES # BLD AUTO: 0.5 K/UL (ref 0.3–1)
MONOCYTES NFR BLD: 14.3 % (ref 4–15)
NEUTROPHILS # BLD AUTO: 1.3 K/UL (ref 1.8–7.7)
NEUTROPHILS NFR BLD: 37.1 % (ref 38–73)
NRBC BLD-RTO: 0 /100 WBC
PLATELET # BLD AUTO: 102 K/UL (ref 150–350)
PMV BLD AUTO: 10.9 FL (ref 9.2–12.9)
RBC # BLD AUTO: 4 M/UL (ref 4.6–6.2)
WBC # BLD AUTO: 3.56 K/UL (ref 3.9–12.7)

## 2019-12-30 PROCEDURE — 36415 COLL VENOUS BLD VENIPUNCTURE: CPT | Mod: PN

## 2019-12-30 PROCEDURE — 85025 COMPLETE CBC W/AUTO DIFF WBC: CPT | Mod: PN

## 2019-12-30 PROCEDURE — 85025 COMPLETE CBC W/AUTO DIFF WBC: CPT

## 2020-01-01 ENCOUNTER — INFUSION (OUTPATIENT)
Dept: INFUSION THERAPY | Facility: HOSPITAL | Age: 85
End: 2020-01-01
Attending: INTERNAL MEDICINE
Payer: MEDICARE

## 2020-01-01 ENCOUNTER — LAB VISIT (OUTPATIENT)
Dept: INFUSION THERAPY | Facility: HOSPITAL | Age: 85
End: 2020-01-01
Attending: PHYSICIAN ASSISTANT
Payer: MEDICARE

## 2020-01-01 ENCOUNTER — PATIENT MESSAGE (OUTPATIENT)
Dept: VASCULAR SURGERY | Facility: CLINIC | Age: 85
End: 2020-01-01

## 2020-01-01 ENCOUNTER — PATIENT MESSAGE (OUTPATIENT)
Dept: OTHER | Facility: OTHER | Age: 85
End: 2020-01-01

## 2020-01-01 ENCOUNTER — LAB VISIT (OUTPATIENT)
Dept: LAB | Facility: HOSPITAL | Age: 85
End: 2020-01-01
Attending: INTERNAL MEDICINE
Payer: MEDICARE

## 2020-01-01 ENCOUNTER — DOCUMENTATION ONLY (OUTPATIENT)
Dept: INFUSION THERAPY | Facility: HOSPITAL | Age: 85
End: 2020-01-01

## 2020-01-01 ENCOUNTER — TELEPHONE (OUTPATIENT)
Dept: INFUSION THERAPY | Facility: HOSPITAL | Age: 85
End: 2020-01-01

## 2020-01-01 ENCOUNTER — HOSPITAL ENCOUNTER (OUTPATIENT)
Dept: RADIOLOGY | Facility: HOSPITAL | Age: 85
Discharge: HOME | End: 2020-11-13
Attending: THORACIC SURGERY (CARDIOTHORACIC VASCULAR SURGERY)
Payer: MEDICARE

## 2020-01-01 VITALS
RESPIRATION RATE: 20 BRPM | SYSTOLIC BLOOD PRESSURE: 127 MMHG | TEMPERATURE: 98 F | HEART RATE: 74 BPM | DIASTOLIC BLOOD PRESSURE: 62 MMHG

## 2020-01-01 VITALS
DIASTOLIC BLOOD PRESSURE: 64 MMHG | TEMPERATURE: 98 F | TEMPERATURE: 98 F | TEMPERATURE: 98 F | RESPIRATION RATE: 20 BRPM | RESPIRATION RATE: 20 BRPM | WEIGHT: 174.19 LBS | SYSTOLIC BLOOD PRESSURE: 151 MMHG | SYSTOLIC BLOOD PRESSURE: 139 MMHG | BODY MASS INDEX: 25.72 KG/M2 | DIASTOLIC BLOOD PRESSURE: 78 MMHG | HEART RATE: 73 BPM | SYSTOLIC BLOOD PRESSURE: 126 MMHG | BODY MASS INDEX: 25.72 KG/M2 | DIASTOLIC BLOOD PRESSURE: 59 MMHG | HEART RATE: 76 BPM | HEART RATE: 68 BPM | SYSTOLIC BLOOD PRESSURE: 118 MMHG | RESPIRATION RATE: 20 BRPM | RESPIRATION RATE: 18 BRPM | WEIGHT: 174.19 LBS | TEMPERATURE: 98 F | HEART RATE: 88 BPM | DIASTOLIC BLOOD PRESSURE: 74 MMHG

## 2020-01-01 VITALS
DIASTOLIC BLOOD PRESSURE: 61 MMHG | SYSTOLIC BLOOD PRESSURE: 117 MMHG | TEMPERATURE: 98 F | RESPIRATION RATE: 20 BRPM | SYSTOLIC BLOOD PRESSURE: 123 MMHG | TEMPERATURE: 98 F | RESPIRATION RATE: 18 BRPM | WEIGHT: 178.56 LBS | HEART RATE: 76 BPM | BODY MASS INDEX: 26.37 KG/M2 | SYSTOLIC BLOOD PRESSURE: 112 MMHG | DIASTOLIC BLOOD PRESSURE: 77 MMHG | BODY MASS INDEX: 26.37 KG/M2 | TEMPERATURE: 99 F | DIASTOLIC BLOOD PRESSURE: 78 MMHG | WEIGHT: 178.56 LBS | RESPIRATION RATE: 20 BRPM | HEART RATE: 73 BPM | HEART RATE: 74 BPM

## 2020-01-01 VITALS
HEART RATE: 72 BPM | RESPIRATION RATE: 20 BRPM | WEIGHT: 176.38 LBS | DIASTOLIC BLOOD PRESSURE: 65 MMHG | TEMPERATURE: 97 F | BODY MASS INDEX: 26.05 KG/M2 | RESPIRATION RATE: 20 BRPM | DIASTOLIC BLOOD PRESSURE: 61 MMHG | SYSTOLIC BLOOD PRESSURE: 123 MMHG | HEART RATE: 68 BPM | SYSTOLIC BLOOD PRESSURE: 139 MMHG | TEMPERATURE: 98 F

## 2020-01-01 VITALS
SYSTOLIC BLOOD PRESSURE: 125 MMHG | RESPIRATION RATE: 20 BRPM | DIASTOLIC BLOOD PRESSURE: 78 MMHG | TEMPERATURE: 97 F | BODY MASS INDEX: 26.37 KG/M2 | SYSTOLIC BLOOD PRESSURE: 127 MMHG | DIASTOLIC BLOOD PRESSURE: 64 MMHG | HEART RATE: 65 BPM | HEART RATE: 72 BPM | TEMPERATURE: 98 F | RESPIRATION RATE: 20 BRPM | WEIGHT: 178.56 LBS

## 2020-01-01 VITALS — SYSTOLIC BLOOD PRESSURE: 136 MMHG | DIASTOLIC BLOOD PRESSURE: 67 MMHG | HEART RATE: 70 BPM | RESPIRATION RATE: 14 BRPM

## 2020-01-01 DIAGNOSIS — N18.9 ANEMIA OF RENAL DISEASE: ICD-10-CM

## 2020-01-01 DIAGNOSIS — I71.60 THORACOABDOMINAL AORTIC ANEURYSM, WITHOUT RUPTURE: ICD-10-CM

## 2020-01-01 DIAGNOSIS — D63.1 ANEMIA OF RENAL DISEASE: Primary | ICD-10-CM

## 2020-01-01 DIAGNOSIS — N18.9 ANEMIA OF RENAL DISEASE: Primary | ICD-10-CM

## 2020-01-01 DIAGNOSIS — Z03.818 ENCNTR FOR OBS FOR SUSP EXPSR TO OTH BIOLG AGENTS RULED OUT: ICD-10-CM

## 2020-01-01 DIAGNOSIS — Z03.818 ENCNTR FOR OBS FOR SUSP EXPSR TO OTH BIOLG AGENTS RULED OUT: Primary | ICD-10-CM

## 2020-01-01 DIAGNOSIS — D63.1 ANEMIA OF RENAL DISEASE: ICD-10-CM

## 2020-01-01 LAB
ANISOCYTOSIS BLD QL SMEAR: SLIGHT
BASOPHILS # BLD AUTO: 0 K/UL (ref 0–0.2)
BASOPHILS # BLD AUTO: 0.01 K/UL (ref 0–0.2)
BASOPHILS NFR BLD: 0 % (ref 0–1.9)
BASOPHILS NFR BLD: 0.3 % (ref 0–1.9)
DIFFERENTIAL METHOD: ABNORMAL
EOSINOPHIL # BLD AUTO: 0 K/UL (ref 0–0.5)
EOSINOPHIL NFR BLD: 0 % (ref 0–8)
ERYTHROCYTE [DISTWIDTH] IN BLOOD BY AUTOMATED COUNT: 18 % (ref 11.5–14.5)
ERYTHROCYTE [DISTWIDTH] IN BLOOD BY AUTOMATED COUNT: 18.1 % (ref 11.5–14.5)
ERYTHROCYTE [DISTWIDTH] IN BLOOD BY AUTOMATED COUNT: 18.2 % (ref 11.5–14.5)
ERYTHROCYTE [DISTWIDTH] IN BLOOD BY AUTOMATED COUNT: 18.6 % (ref 11.5–14.5)
GIANT PLATELETS BLD QL SMEAR: PRESENT
HCT VFR BLD AUTO: 33.5 % (ref 40–54)
HCT VFR BLD AUTO: 33.9 % (ref 40–54)
HCT VFR BLD AUTO: 34 % (ref 40–54)
HCT VFR BLD AUTO: 34.8 % (ref 40–54)
HGB BLD-MCNC: 9.7 G/DL (ref 14–18)
HGB BLD-MCNC: 9.9 G/DL (ref 14–18)
HYPOCHROMIA BLD QL SMEAR: ABNORMAL
IMM GRANULOCYTES # BLD AUTO: 0.02 K/UL (ref 0–0.04)
IMM GRANULOCYTES # BLD AUTO: 0.02 K/UL (ref 0–0.04)
IMM GRANULOCYTES # BLD AUTO: 0.03 K/UL (ref 0–0.04)
IMM GRANULOCYTES # BLD AUTO: 0.03 K/UL (ref 0–0.04)
IMM GRANULOCYTES NFR BLD AUTO: 0.6 % (ref 0–0.5)
IMM GRANULOCYTES NFR BLD AUTO: 0.6 % (ref 0–0.5)
IMM GRANULOCYTES NFR BLD AUTO: 0.8 % (ref 0–0.5)
IMM GRANULOCYTES NFR BLD AUTO: 0.8 % (ref 0–0.5)
LYMPHOCYTES # BLD AUTO: 1.4 K/UL (ref 1–4.8)
LYMPHOCYTES # BLD AUTO: 1.5 K/UL (ref 1–4.8)
LYMPHOCYTES # BLD AUTO: 1.6 K/UL (ref 1–4.8)
LYMPHOCYTES # BLD AUTO: 1.7 K/UL (ref 1–4.8)
LYMPHOCYTES NFR BLD: 40.6 % (ref 18–48)
LYMPHOCYTES NFR BLD: 41.5 % (ref 18–48)
LYMPHOCYTES NFR BLD: 42.8 % (ref 18–48)
LYMPHOCYTES NFR BLD: 46.8 % (ref 18–48)
MCH RBC QN AUTO: 26.5 PG (ref 27–31)
MCH RBC QN AUTO: 26.8 PG (ref 27–31)
MCHC RBC AUTO-ENTMCNC: 28.4 G/DL (ref 32–36)
MCHC RBC AUTO-ENTMCNC: 29 G/DL (ref 32–36)
MCHC RBC AUTO-ENTMCNC: 29.1 G/DL (ref 32–36)
MCHC RBC AUTO-ENTMCNC: 29.2 G/DL (ref 32–36)
MCV RBC AUTO: 92 FL (ref 82–98)
MCV RBC AUTO: 94 FL (ref 82–98)
MONOCYTES # BLD AUTO: 0.5 K/UL (ref 0.3–1)
MONOCYTES NFR BLD: 13.2 % (ref 4–15)
MONOCYTES NFR BLD: 13.6 % (ref 4–15)
MONOCYTES NFR BLD: 14.8 % (ref 4–15)
MONOCYTES NFR BLD: 15.7 % (ref 4–15)
NEUTROPHILS # BLD AUTO: 1.4 K/UL (ref 1.8–7.7)
NEUTROPHILS # BLD AUTO: 1.4 K/UL (ref 1.8–7.7)
NEUTROPHILS # BLD AUTO: 1.5 K/UL (ref 1.8–7.7)
NEUTROPHILS # BLD AUTO: 1.7 K/UL (ref 1.8–7.7)
NEUTROPHILS NFR BLD: 39.2 % (ref 38–73)
NEUTROPHILS NFR BLD: 40.9 % (ref 38–73)
NEUTROPHILS NFR BLD: 42.8 % (ref 38–73)
NEUTROPHILS NFR BLD: 45 % (ref 38–73)
NRBC BLD-RTO: 0 /100 WBC
OVALOCYTES BLD QL SMEAR: ABNORMAL
PLATELET # BLD AUTO: 115 K/UL (ref 150–350)
PLATELET # BLD AUTO: 125 K/UL (ref 150–350)
PLATELET # BLD AUTO: 126 K/UL (ref 150–350)
PLATELET # BLD AUTO: 139 K/UL (ref 150–350)
PMV BLD AUTO: 10 FL (ref 9.2–12.9)
PMV BLD AUTO: 10.4 FL (ref 9.2–12.9)
PMV BLD AUTO: 10.8 FL (ref 9.2–12.9)
PMV BLD AUTO: 9.4 FL (ref 9.2–12.9)
POLYCHROMASIA BLD QL SMEAR: ABNORMAL
RBC # BLD AUTO: 3.66 M/UL (ref 4.6–6.2)
RBC # BLD AUTO: 3.69 M/UL (ref 4.6–6.2)
RBC # BLD AUTO: 3.7 M/UL (ref 4.6–6.2)
RBC # BLD AUTO: 3.7 M/UL (ref 4.6–6.2)
SARS-COV-2 RNA RESP QL NAA+PROBE: NOT DETECTED
WBC # BLD AUTO: 3.32 K/UL (ref 3.9–12.7)
WBC # BLD AUTO: 3.57 K/UL (ref 3.9–12.7)
WBC # BLD AUTO: 3.59 K/UL (ref 3.9–12.7)
WBC # BLD AUTO: 3.82 K/UL (ref 3.9–12.7)

## 2020-01-01 PROCEDURE — 85025 COMPLETE CBC W/AUTO DIFF WBC: CPT | Mod: PN

## 2020-01-01 PROCEDURE — 63600175 PHARM REV CODE 636 W HCPCS: Mod: PN | Performed by: INTERNAL MEDICINE

## 2020-01-01 PROCEDURE — 96372 THER/PROPH/DIAG INJ SC/IM: CPT | Mod: PN

## 2020-01-01 PROCEDURE — U0003 INFECTIOUS AGENT DETECTION BY NUCLEIC ACID (DNA OR RNA); SEVERE ACUTE RESPIRATORY SYNDROME CORONAVIRUS 2 (SARS-COV-2) (CORONAVIRUS DISEASE [COVID-19]), AMPLIFIED PROBE TECHNIQUE, MAKING USE OF HIGH THROUGHPUT TECHNOLOGIES AS DESCRIBED BY CMS-2020-01-R: HCPCS

## 2020-01-01 PROCEDURE — 71250 CT CHEST ABDOMEN PELVIS WITHOUT CONTRAST(XPD): ICD-10-PCS | Mod: 26,,, | Performed by: RADIOLOGY

## 2020-01-01 PROCEDURE — 71250 CT THORAX DX C-: CPT | Mod: TC,PO

## 2020-01-01 PROCEDURE — 36415 COLL VENOUS BLD VENIPUNCTURE: CPT | Mod: PN

## 2020-01-01 PROCEDURE — U0002 COVID-19 LAB TEST NON-CDC: HCPCS

## 2020-01-01 PROCEDURE — 71250 CT THORAX DX C-: CPT | Mod: 26,,, | Performed by: RADIOLOGY

## 2020-01-01 PROCEDURE — 74176 CT CHEST ABDOMEN PELVIS WITHOUT CONTRAST(XPD): ICD-10-PCS | Mod: 26,,, | Performed by: RADIOLOGY

## 2020-01-01 PROCEDURE — 74176 CT ABD & PELVIS W/O CONTRAST: CPT | Mod: 26,,, | Performed by: RADIOLOGY

## 2020-01-01 PROCEDURE — 85025 COMPLETE CBC W/AUTO DIFF WBC: CPT

## 2020-01-01 PROCEDURE — 63600175 PHARM REV CODE 636 W HCPCS: Mod: PN,EC | Performed by: INTERNAL MEDICINE

## 2020-01-01 PROCEDURE — 74176 CT ABD & PELVIS W/O CONTRAST: CPT | Mod: TC,PO

## 2020-01-01 RX ADMIN — EPOETIN ALFA-EPBX 40000 UNITS: 40000 INJECTION, SOLUTION INTRAVENOUS; SUBCUTANEOUS at 03:04

## 2020-01-01 RX ADMIN — EPOETIN ALFA-EPBX 40000 UNITS: 40000 INJECTION, SOLUTION INTRAVENOUS; SUBCUTANEOUS at 03:05

## 2020-01-01 RX ADMIN — EPOETIN ALFA-EPBX 40000 UNITS: 40000 INJECTION, SOLUTION INTRAVENOUS; SUBCUTANEOUS at 03:06

## 2020-01-01 RX ADMIN — EPOETIN ALFA-EPBX 40000 UNITS: 40000 INJECTION, SOLUTION INTRAVENOUS; SUBCUTANEOUS at 04:05

## 2020-01-01 RX ADMIN — EPOETIN ALFA-EPBX 40000 UNITS: 40000 INJECTION, SOLUTION INTRAVENOUS; SUBCUTANEOUS at 01:04

## 2020-01-02 ENCOUNTER — INFUSION (OUTPATIENT)
Dept: INFUSION THERAPY | Facility: HOSPITAL | Age: 85
End: 2020-01-02
Attending: INTERNAL MEDICINE
Payer: MEDICARE

## 2020-01-02 VITALS
TEMPERATURE: 98 F | RESPIRATION RATE: 20 BRPM | HEART RATE: 69 BPM | DIASTOLIC BLOOD PRESSURE: 70 MMHG | SYSTOLIC BLOOD PRESSURE: 160 MMHG

## 2020-01-02 DIAGNOSIS — D63.1 ANEMIA OF RENAL DISEASE: Primary | ICD-10-CM

## 2020-01-02 DIAGNOSIS — N18.9 ANEMIA OF RENAL DISEASE: Primary | ICD-10-CM

## 2020-01-02 PROCEDURE — 96372 THER/PROPH/DIAG INJ SC/IM: CPT | Mod: PN

## 2020-01-02 PROCEDURE — 63600175 PHARM REV CODE 636 W HCPCS: Mod: PN | Performed by: INTERNAL MEDICINE

## 2020-01-02 RX ADMIN — EPOETIN ALFA-EPBX 40000 UNITS: 40000 INJECTION, SOLUTION INTRAVENOUS; SUBCUTANEOUS at 03:01

## 2020-01-08 ENCOUNTER — INFUSION (OUTPATIENT)
Dept: INFUSION THERAPY | Facility: HOSPITAL | Age: 85
End: 2020-01-08
Attending: INTERNAL MEDICINE
Payer: MEDICARE

## 2020-01-08 VITALS
SYSTOLIC BLOOD PRESSURE: 145 MMHG | DIASTOLIC BLOOD PRESSURE: 74 MMHG | HEART RATE: 63 BPM | TEMPERATURE: 98 F | RESPIRATION RATE: 18 BRPM

## 2020-01-08 DIAGNOSIS — D63.1 ANEMIA OF RENAL DISEASE: Primary | ICD-10-CM

## 2020-01-08 DIAGNOSIS — N18.9 ANEMIA OF RENAL DISEASE: Primary | ICD-10-CM

## 2020-01-08 PROCEDURE — 96372 THER/PROPH/DIAG INJ SC/IM: CPT | Mod: PN

## 2020-01-08 PROCEDURE — 63600175 PHARM REV CODE 636 W HCPCS: Mod: PN | Performed by: INTERNAL MEDICINE

## 2020-01-08 RX ADMIN — EPOETIN ALFA-EPBX 40000 UNITS: 40000 INJECTION, SOLUTION INTRAVENOUS; SUBCUTANEOUS at 03:01

## 2020-01-15 ENCOUNTER — INFUSION (OUTPATIENT)
Dept: INFUSION THERAPY | Facility: HOSPITAL | Age: 85
End: 2020-01-15
Attending: INTERNAL MEDICINE
Payer: MEDICARE

## 2020-01-15 VITALS
SYSTOLIC BLOOD PRESSURE: 135 MMHG | TEMPERATURE: 98 F | DIASTOLIC BLOOD PRESSURE: 70 MMHG | RESPIRATION RATE: 18 BRPM | HEART RATE: 65 BPM

## 2020-01-15 DIAGNOSIS — D63.1 ANEMIA OF RENAL DISEASE: Primary | ICD-10-CM

## 2020-01-15 DIAGNOSIS — N18.9 ANEMIA OF RENAL DISEASE: Primary | ICD-10-CM

## 2020-01-15 PROCEDURE — 63600175 PHARM REV CODE 636 W HCPCS: Mod: PN | Performed by: INTERNAL MEDICINE

## 2020-01-15 PROCEDURE — 96372 THER/PROPH/DIAG INJ SC/IM: CPT | Mod: PN

## 2020-01-15 RX ADMIN — EPOETIN ALFA-EPBX 40000 UNITS: 40000 INJECTION, SOLUTION INTRAVENOUS; SUBCUTANEOUS at 03:01

## 2020-01-22 ENCOUNTER — INFUSION (OUTPATIENT)
Dept: INFUSION THERAPY | Facility: HOSPITAL | Age: 85
End: 2020-01-22
Attending: INTERNAL MEDICINE
Payer: MEDICARE

## 2020-01-22 VITALS
DIASTOLIC BLOOD PRESSURE: 78 MMHG | RESPIRATION RATE: 18 BRPM | TEMPERATURE: 98 F | SYSTOLIC BLOOD PRESSURE: 142 MMHG | HEART RATE: 68 BPM

## 2020-01-22 DIAGNOSIS — D63.1 ANEMIA OF RENAL DISEASE: Primary | ICD-10-CM

## 2020-01-22 DIAGNOSIS — N18.9 ANEMIA OF RENAL DISEASE: Primary | ICD-10-CM

## 2020-01-22 PROCEDURE — 63600175 PHARM REV CODE 636 W HCPCS: Mod: PN | Performed by: INTERNAL MEDICINE

## 2020-01-22 PROCEDURE — 96372 THER/PROPH/DIAG INJ SC/IM: CPT | Mod: PN

## 2020-01-22 RX ADMIN — EPOETIN ALFA-EPBX 40000 UNITS: 40000 INJECTION, SOLUTION INTRAVENOUS; SUBCUTANEOUS at 03:01

## 2020-01-29 ENCOUNTER — INFUSION (OUTPATIENT)
Dept: INFUSION THERAPY | Facility: HOSPITAL | Age: 85
End: 2020-01-29
Attending: INTERNAL MEDICINE
Payer: MEDICARE

## 2020-01-29 VITALS
WEIGHT: 174.19 LBS | SYSTOLIC BLOOD PRESSURE: 143 MMHG | DIASTOLIC BLOOD PRESSURE: 71 MMHG | RESPIRATION RATE: 18 BRPM | HEART RATE: 65 BPM | BODY MASS INDEX: 25.72 KG/M2 | TEMPERATURE: 98 F

## 2020-01-29 DIAGNOSIS — N18.9 ANEMIA OF RENAL DISEASE: Primary | ICD-10-CM

## 2020-01-29 DIAGNOSIS — D63.1 ANEMIA OF RENAL DISEASE: Primary | ICD-10-CM

## 2020-01-29 PROCEDURE — 63600175 PHARM REV CODE 636 W HCPCS: Mod: PN | Performed by: INTERNAL MEDICINE

## 2020-01-29 PROCEDURE — 96372 THER/PROPH/DIAG INJ SC/IM: CPT | Mod: PN

## 2020-01-29 RX ADMIN — EPOETIN ALFA-EPBX 40000 UNITS: 40000 INJECTION, SOLUTION INTRAVENOUS; SUBCUTANEOUS at 02:01

## 2020-02-05 ENCOUNTER — INFUSION (OUTPATIENT)
Dept: INFUSION THERAPY | Facility: HOSPITAL | Age: 85
End: 2020-02-05
Attending: INTERNAL MEDICINE
Payer: MEDICARE

## 2020-02-05 VITALS — SYSTOLIC BLOOD PRESSURE: 154 MMHG | RESPIRATION RATE: 14 BRPM | HEART RATE: 68 BPM | DIASTOLIC BLOOD PRESSURE: 73 MMHG

## 2020-02-05 DIAGNOSIS — D63.1 ANEMIA OF RENAL DISEASE: Primary | ICD-10-CM

## 2020-02-05 DIAGNOSIS — N18.9 ANEMIA OF RENAL DISEASE: Primary | ICD-10-CM

## 2020-02-05 PROCEDURE — 63600175 PHARM REV CODE 636 W HCPCS: Mod: PN | Performed by: INTERNAL MEDICINE

## 2020-02-05 PROCEDURE — 96372 THER/PROPH/DIAG INJ SC/IM: CPT | Mod: PN

## 2020-02-05 RX ADMIN — EPOETIN ALFA-EPBX 40000 UNITS: 40000 INJECTION, SOLUTION INTRAVENOUS; SUBCUTANEOUS at 03:02

## 2020-02-12 ENCOUNTER — INFUSION (OUTPATIENT)
Dept: INFUSION THERAPY | Facility: HOSPITAL | Age: 85
End: 2020-02-12
Attending: INTERNAL MEDICINE
Payer: MEDICARE

## 2020-02-12 VITALS
DIASTOLIC BLOOD PRESSURE: 67 MMHG | RESPIRATION RATE: 20 BRPM | SYSTOLIC BLOOD PRESSURE: 135 MMHG | BODY MASS INDEX: 25.72 KG/M2 | HEART RATE: 67 BPM | WEIGHT: 174.19 LBS

## 2020-02-12 DIAGNOSIS — D63.1 ANEMIA OF RENAL DISEASE: Primary | ICD-10-CM

## 2020-02-12 DIAGNOSIS — N18.9 ANEMIA OF RENAL DISEASE: Primary | ICD-10-CM

## 2020-02-12 PROCEDURE — 96372 THER/PROPH/DIAG INJ SC/IM: CPT | Mod: PN

## 2020-02-12 PROCEDURE — 63600175 PHARM REV CODE 636 W HCPCS: Mod: PN | Performed by: INTERNAL MEDICINE

## 2020-02-12 RX ADMIN — EPOETIN ALFA-EPBX 40000 UNITS: 40000 INJECTION, SOLUTION INTRAVENOUS; SUBCUTANEOUS at 03:02

## 2020-02-18 ENCOUNTER — OFFICE VISIT (OUTPATIENT)
Dept: OPHTHALMOLOGY | Facility: CLINIC | Age: 85
End: 2020-02-18
Payer: MEDICARE

## 2020-02-18 DIAGNOSIS — H25.11 NUCLEAR SCLEROSIS, RIGHT: ICD-10-CM

## 2020-02-18 DIAGNOSIS — H16.223 KERATOCONJUNCTIVITIS SICCA DUE TO DECREASED TEAR PRODUCTION, BILATERAL: Primary | ICD-10-CM

## 2020-02-18 DIAGNOSIS — E11.9 DIABETES MELLITUS TYPE 2 WITHOUT RETINOPATHY: ICD-10-CM

## 2020-02-18 DIAGNOSIS — H18.453 SALZMANN'S NODULAR DEGENERATION OF CORNEAS OF BOTH EYES: ICD-10-CM

## 2020-02-18 DIAGNOSIS — Z96.1 PSEUDOPHAKIA, LEFT EYE: ICD-10-CM

## 2020-02-18 DIAGNOSIS — H52.7 REFRACTIVE ERROR: ICD-10-CM

## 2020-02-18 PROCEDURE — 99999 PR PBB SHADOW E&M-EST. PATIENT-LVL III: ICD-10-PCS | Mod: PBBFAC,,, | Performed by: OPHTHALMOLOGY

## 2020-02-18 PROCEDURE — 92012 INTRM OPH EXAM EST PATIENT: CPT | Mod: S$PBB,,, | Performed by: OPHTHALMOLOGY

## 2020-02-18 PROCEDURE — 99999 PR PBB SHADOW E&M-EST. PATIENT-LVL III: CPT | Mod: PBBFAC,,, | Performed by: OPHTHALMOLOGY

## 2020-02-18 PROCEDURE — 99213 OFFICE O/P EST LOW 20 MIN: CPT | Mod: PBBFAC,PO | Performed by: OPHTHALMOLOGY

## 2020-02-18 PROCEDURE — 92012 PR EYE EXAM, EST PATIENT,INTERMED: ICD-10-PCS | Mod: S$PBB,,, | Performed by: OPHTHALMOLOGY

## 2020-02-18 PROCEDURE — A4262 TEMPORARY TEAR DUCT PLUG: HCPCS | Mod: PBBFAC,PO | Performed by: OPHTHALMOLOGY

## 2020-02-18 RX ORDER — TRIAMCINOLONE ACETONIDE 1 MG/G
CREAM TOPICAL
COMMUNITY
Start: 2019-11-25 | End: 2020-01-01

## 2020-02-18 RX ORDER — AMLODIPINE BESYLATE 5 MG/1
5 TABLET ORAL DAILY
Status: ON HOLD | COMMUNITY
Start: 2020-01-14 | End: 2021-01-01 | Stop reason: HOSPADM

## 2020-02-18 NOTE — PROGRESS NOTES
HPI     DLE- 8/29/19     Pt is here for chronic dry eye. Pt's daughter states they have tried a   bunch of different preservative drops. On average he uses his drops a   dozen times a day with no relief. Constant gritty feeling in OU.     Thera-tears, Refresh, Systane, and Clear Eyes - has tried all 4 - prefers   99 cent bottle from [employer] the best.     Last edited by Pat Foster MD on 2/18/2020  2:07 PM.   (History)            Assessment /Plan     For exam results, see Encounter Report.    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 Pt amenable to trying collagen inserts. If helpful then RTC for Prolong plugs. If not helpful then try Restasis. Continue artificial tears (non-preserved) and nighttime liqui-gel drop due to mild lagophthalmos OD. Continue warm compresses, lid hygiene.    Not visually significant. Dilates 4.8 mm with 1%. Benefit likely does not outweigh risk at this point in 89 y/o pt. Would need Atropine 3 days prior due to Cardura. F/u 1 year.     Stable    No retionpathy. F/u 1 year.    MRx given prior visit

## 2020-02-19 ENCOUNTER — INFUSION (OUTPATIENT)
Dept: INFUSION THERAPY | Facility: HOSPITAL | Age: 85
End: 2020-02-19
Attending: INTERNAL MEDICINE
Payer: MEDICARE

## 2020-02-19 VITALS — RESPIRATION RATE: 20 BRPM | SYSTOLIC BLOOD PRESSURE: 114 MMHG | HEART RATE: 67 BPM | DIASTOLIC BLOOD PRESSURE: 59 MMHG

## 2020-02-19 DIAGNOSIS — D63.1 ANEMIA OF RENAL DISEASE: Primary | ICD-10-CM

## 2020-02-19 DIAGNOSIS — N18.9 ANEMIA OF RENAL DISEASE: Primary | ICD-10-CM

## 2020-02-19 PROCEDURE — 63600175 PHARM REV CODE 636 W HCPCS: Mod: PN | Performed by: INTERNAL MEDICINE

## 2020-02-19 PROCEDURE — 96372 THER/PROPH/DIAG INJ SC/IM: CPT | Mod: PN

## 2020-02-19 RX ADMIN — EPOETIN ALFA-EPBX 40000 UNITS: 40000 INJECTION, SOLUTION INTRAVENOUS; SUBCUTANEOUS at 03:02

## 2020-02-20 ENCOUNTER — TELEPHONE (OUTPATIENT)
Dept: OPHTHALMOLOGY | Facility: CLINIC | Age: 85
End: 2020-02-20

## 2020-02-20 NOTE — TELEPHONE ENCOUNTER
----- Message from Abilio Katz sent at 2/20/2020 11:44 AM CST -----  Contact: kieran Pulido  [phone]  Type: Needs a Sooner appmnt     Who Called:  kieran Pulido  [phone]    Symptoms (please be specific):  Would like to have additional cologne treatment for his eyes.    Best Call Back Number: kieran Pulido  [phone]    Additional Information: Advised the ptnt would like additional cologne treatment, and an eye exam. Please call to schedule.

## 2020-02-26 ENCOUNTER — INFUSION (OUTPATIENT)
Dept: INFUSION THERAPY | Facility: HOSPITAL | Age: 85
End: 2020-02-26
Attending: INTERNAL MEDICINE
Payer: MEDICARE

## 2020-02-26 VITALS
TEMPERATURE: 98 F | RESPIRATION RATE: 20 BRPM | DIASTOLIC BLOOD PRESSURE: 78 MMHG | HEART RATE: 76 BPM | SYSTOLIC BLOOD PRESSURE: 132 MMHG

## 2020-02-26 DIAGNOSIS — N18.9 ANEMIA OF RENAL DISEASE: Primary | ICD-10-CM

## 2020-02-26 DIAGNOSIS — D63.1 ANEMIA OF RENAL DISEASE: Primary | ICD-10-CM

## 2020-02-26 PROCEDURE — 63600175 PHARM REV CODE 636 W HCPCS: Mod: PN | Performed by: INTERNAL MEDICINE

## 2020-02-26 PROCEDURE — 96372 THER/PROPH/DIAG INJ SC/IM: CPT | Mod: PN

## 2020-02-26 RX ADMIN — EPOETIN ALFA-EPBX 40000 UNITS: 40000 INJECTION, SOLUTION INTRAVENOUS; SUBCUTANEOUS at 03:02

## 2020-03-04 ENCOUNTER — INFUSION (OUTPATIENT)
Dept: INFUSION THERAPY | Facility: HOSPITAL | Age: 85
End: 2020-03-04
Attending: INTERNAL MEDICINE
Payer: MEDICARE

## 2020-03-04 VITALS
BODY MASS INDEX: 25.72 KG/M2 | RESPIRATION RATE: 20 BRPM | SYSTOLIC BLOOD PRESSURE: 117 MMHG | DIASTOLIC BLOOD PRESSURE: 60 MMHG | HEART RATE: 70 BPM | WEIGHT: 174.19 LBS

## 2020-03-04 DIAGNOSIS — N18.9 ANEMIA OF RENAL DISEASE: Primary | ICD-10-CM

## 2020-03-04 DIAGNOSIS — D63.1 ANEMIA OF RENAL DISEASE: Primary | ICD-10-CM

## 2020-03-04 PROCEDURE — 96372 THER/PROPH/DIAG INJ SC/IM: CPT | Mod: PN

## 2020-03-04 PROCEDURE — 63600175 PHARM REV CODE 636 W HCPCS: Mod: PN | Performed by: INTERNAL MEDICINE

## 2020-03-04 RX ADMIN — EPOETIN ALFA-EPBX 40000 UNITS: 40000 INJECTION, SOLUTION INTRAVENOUS; SUBCUTANEOUS at 03:03

## 2020-03-05 ENCOUNTER — PROCEDURE VISIT (OUTPATIENT)
Dept: OPHTHALMOLOGY | Facility: CLINIC | Age: 85
End: 2020-03-05
Payer: MEDICARE

## 2020-03-05 DIAGNOSIS — H52.7 REFRACTIVE ERROR: ICD-10-CM

## 2020-03-05 DIAGNOSIS — E11.9 DIABETES MELLITUS TYPE 2 WITHOUT RETINOPATHY: ICD-10-CM

## 2020-03-05 DIAGNOSIS — H16.223 KERATOCONJUNCTIVITIS SICCA DUE TO DECREASED TEAR PRODUCTION, BILATERAL: Primary | ICD-10-CM

## 2020-03-05 DIAGNOSIS — H18.453 SALZMANN'S NODULAR DEGENERATION OF CORNEAS OF BOTH EYES: ICD-10-CM

## 2020-03-05 DIAGNOSIS — H25.11 NUCLEAR SCLEROSIS, RIGHT: ICD-10-CM

## 2020-03-05 DIAGNOSIS — Z96.1 PSEUDOPHAKIA, LEFT EYE: ICD-10-CM

## 2020-03-05 PROCEDURE — 68761 PR CLOSE TEAR DUCT OPENING BY PLUG,EA: ICD-10-PCS | Mod: 50,S$PBB,, | Performed by: OPHTHALMOLOGY

## 2020-03-05 PROCEDURE — 92012 INTRM OPH EXAM EST PATIENT: CPT | Mod: 25,S$PBB,, | Performed by: OPHTHALMOLOGY

## 2020-03-05 PROCEDURE — 92012 PR EYE EXAM, EST PATIENT,INTERMED: ICD-10-PCS | Mod: 25,S$PBB,, | Performed by: OPHTHALMOLOGY

## 2020-03-05 PROCEDURE — 68761 CLOSE TEAR DUCT OPENING: CPT | Mod: 50,S$PBB,, | Performed by: OPHTHALMOLOGY

## 2020-03-05 PROCEDURE — 68761 CLOSE TEAR DUCT OPENING: CPT | Mod: E4,E2,PBBFAC,PO | Performed by: OPHTHALMOLOGY

## 2020-03-05 NOTE — PROGRESS NOTES
HPI     F/u Dry Eye follow up. Would like to try more plugs. States this vision   has declined since last visit would like new prescription for glasses. .     Last edited by Lynn Hernandez on 3/5/2020  2:12 PM. (History)            Assessment /Plan     For exam results, see Encounter Report.    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 Dilates 4.8 mm with 1%. Benefit likely does not outweigh risk at this point in 89 y/o pt. Would need Atropine 3 days prior due to Cardura. F/u 1 year.     Stable    Switch artificial tears to non-preserved formula. Collagen inserts seemed to help - Prolong 3000 placed BLL today. Continue nighttime lubricating ointment or liqui-gel drop due to mild lagophthalmos OD. Warm compresses, lid hygiene. Handout given.     No retionpathy. Dilate 1 year.    MRx given.    Follow up in about 4 months (around 7/5/2020) for replace Prolong 3000 plugs.

## 2020-03-10 ENCOUNTER — PATIENT MESSAGE (OUTPATIENT)
Dept: OPHTHALMOLOGY | Facility: CLINIC | Age: 85
End: 2020-03-10

## 2020-03-11 ENCOUNTER — TELEPHONE (OUTPATIENT)
Dept: OPHTHALMOLOGY | Facility: CLINIC | Age: 85
End: 2020-03-11

## 2020-03-11 ENCOUNTER — INFUSION (OUTPATIENT)
Dept: INFUSION THERAPY | Facility: HOSPITAL | Age: 85
End: 2020-03-11
Attending: INTERNAL MEDICINE
Payer: MEDICARE

## 2020-03-11 VITALS
RESPIRATION RATE: 20 BRPM | HEART RATE: 69 BPM | SYSTOLIC BLOOD PRESSURE: 118 MMHG | DIASTOLIC BLOOD PRESSURE: 62 MMHG | TEMPERATURE: 98 F

## 2020-03-11 DIAGNOSIS — D63.1 ANEMIA OF RENAL DISEASE: Primary | ICD-10-CM

## 2020-03-11 DIAGNOSIS — N18.9 ANEMIA OF RENAL DISEASE: Primary | ICD-10-CM

## 2020-03-11 PROCEDURE — 96372 THER/PROPH/DIAG INJ SC/IM: CPT | Mod: PN

## 2020-03-11 PROCEDURE — 63600175 PHARM REV CODE 636 W HCPCS: Mod: PN | Performed by: INTERNAL MEDICINE

## 2020-03-11 RX ADMIN — EPOETIN ALFA-EPBX 40000 UNITS: 40000 INJECTION, SOLUTION INTRAVENOUS; SUBCUTANEOUS at 02:03

## 2020-03-11 NOTE — TELEPHONE ENCOUNTER
----- Message from Princess LISBETH Lees sent at 3/11/2020  2:56 PM CDT -----  Contact: Tess Armando (Daughter)  Type: Needs Medical Advice    Who Called:  atient  Best Call Back Number:   Additional Information: Requesting a call in regards to the Procedure from 03/05/20

## 2020-03-12 ENCOUNTER — OFFICE VISIT (OUTPATIENT)
Dept: OPHTHALMOLOGY | Facility: CLINIC | Age: 85
End: 2020-03-12
Payer: MEDICARE

## 2020-03-12 DIAGNOSIS — E11.9 DIABETES MELLITUS TYPE 2 WITHOUT RETINOPATHY: ICD-10-CM

## 2020-03-12 DIAGNOSIS — H52.7 REFRACTIVE ERROR: ICD-10-CM

## 2020-03-12 DIAGNOSIS — Z96.1 PSEUDOPHAKIA, LEFT EYE: ICD-10-CM

## 2020-03-12 DIAGNOSIS — H16.223 KERATOCONJUNCTIVITIS SICCA DUE TO DECREASED TEAR PRODUCTION, BILATERAL: Primary | ICD-10-CM

## 2020-03-12 DIAGNOSIS — H25.11 NUCLEAR SCLEROSIS, RIGHT: ICD-10-CM

## 2020-03-12 DIAGNOSIS — H18.453 SALZMANN'S NODULAR DEGENERATION OF CORNEAS OF BOTH EYES: ICD-10-CM

## 2020-03-12 PROCEDURE — 99999 PR PBB SHADOW E&M-EST. PATIENT-LVL III: CPT | Mod: PBBFAC,,, | Performed by: OPHTHALMOLOGY

## 2020-03-12 PROCEDURE — 99024 PR POST-OP FOLLOW-UP VISIT: ICD-10-PCS | Mod: POP,,, | Performed by: OPHTHALMOLOGY

## 2020-03-12 PROCEDURE — 99024 POSTOP FOLLOW-UP VISIT: CPT | Mod: POP,,, | Performed by: OPHTHALMOLOGY

## 2020-03-12 PROCEDURE — 99999 PR PBB SHADOW E&M-EST. PATIENT-LVL III: ICD-10-PCS | Mod: PBBFAC,,, | Performed by: OPHTHALMOLOGY

## 2020-03-12 PROCEDURE — 99213 OFFICE O/P EST LOW 20 MIN: CPT | Mod: PBBFAC,PO | Performed by: OPHTHALMOLOGY

## 2020-03-12 RX ORDER — CYCLOSPORINE 0.5 MG/ML
1 EMULSION OPHTHALMIC 2 TIMES DAILY
Qty: 180 EACH | Refills: 11 | Status: SHIPPED | OUTPATIENT
Start: 2020-03-12 | End: 2021-01-01

## 2020-03-12 RX ORDER — CYCLOSPORINE 0.5 MG/ML
1 EMULSION OPHTHALMIC 2 TIMES DAILY
Qty: 180 EACH | Refills: 11 | Status: SHIPPED | OUTPATIENT
Start: 2020-03-12 | End: 2020-03-12 | Stop reason: SDUPTHER

## 2020-03-12 NOTE — PROGRESS NOTES
HPI     DLS: 2/18/20    Pt states for a few days after having plugs OU he started having trouble   again with dry eyes. States eyes are bothering him again. States eyes are   very very dry. States using refresh, B&L at's. thera tears. OU through out   the day feels like he has gravel in eyes.     Last edited by Jennifer Lundberg on 3/12/2020  2:54 PM. (History)            Assessment /Plan     For exam results, see Encounter Report.    Keratoconjunctivitis sicca due to decreased tear production, bilateral  -     cycloSPORINE (RESTASIS) 0.05 % ophthalmic emulsion; Place 1 drop into both eyes 2 (two) times daily.  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  Dispense: 180 each; Refill: 11        Still symptomatic after Prolong plugs. Add Restasis BID OU today 3/12/20.     Follow up for as previously scheduled.

## 2020-03-18 ENCOUNTER — INFUSION (OUTPATIENT)
Dept: INFUSION THERAPY | Facility: HOSPITAL | Age: 85
End: 2020-03-18
Attending: INTERNAL MEDICINE
Payer: MEDICARE

## 2020-03-18 VITALS
DIASTOLIC BLOOD PRESSURE: 55 MMHG | HEART RATE: 65 BPM | WEIGHT: 174.19 LBS | RESPIRATION RATE: 20 BRPM | BODY MASS INDEX: 25.72 KG/M2 | SYSTOLIC BLOOD PRESSURE: 131 MMHG

## 2020-03-18 DIAGNOSIS — D63.1 ANEMIA OF RENAL DISEASE: Primary | ICD-10-CM

## 2020-03-18 DIAGNOSIS — N18.9 ANEMIA OF RENAL DISEASE: Primary | ICD-10-CM

## 2020-03-18 PROCEDURE — 96372 THER/PROPH/DIAG INJ SC/IM: CPT | Mod: PN

## 2020-03-18 PROCEDURE — 63600175 PHARM REV CODE 636 W HCPCS: Mod: PN | Performed by: INTERNAL MEDICINE

## 2020-03-18 RX ADMIN — EPOETIN ALFA-EPBX 40000 UNITS: 40000 INJECTION, SOLUTION INTRAVENOUS; SUBCUTANEOUS at 03:03

## 2020-03-25 ENCOUNTER — INFUSION (OUTPATIENT)
Dept: INFUSION THERAPY | Facility: HOSPITAL | Age: 85
End: 2020-03-25
Attending: INTERNAL MEDICINE
Payer: MEDICARE

## 2020-03-25 VITALS — SYSTOLIC BLOOD PRESSURE: 135 MMHG | HEART RATE: 74 BPM | DIASTOLIC BLOOD PRESSURE: 65 MMHG | RESPIRATION RATE: 14 BRPM

## 2020-03-25 DIAGNOSIS — N18.9 ANEMIA OF RENAL DISEASE: Primary | ICD-10-CM

## 2020-03-25 DIAGNOSIS — D63.1 ANEMIA OF RENAL DISEASE: Primary | ICD-10-CM

## 2020-03-25 PROCEDURE — 63600175 PHARM REV CODE 636 W HCPCS: Mod: PN | Performed by: INTERNAL MEDICINE

## 2020-03-25 PROCEDURE — 96372 THER/PROPH/DIAG INJ SC/IM: CPT | Mod: PN

## 2020-03-25 RX ADMIN — EPOETIN ALFA-EPBX 40000 UNITS: 40000 INJECTION, SOLUTION INTRAVENOUS; SUBCUTANEOUS at 04:03

## 2020-03-30 NOTE — TELEPHONE ENCOUNTER
----- Message from Susi Guerra sent at 3/30/2020  9:01 AM CDT -----  Caller: Patient's daughter                 Callback number: [phone]                        Reason: Request a call to r/s patient's infusion apt                 Thank you
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
## 2020-09-14 PROBLEM — N18.30 CKD (CHRONIC KIDNEY DISEASE) STAGE 3, GFR 30-59 ML/MIN: Status: ACTIVE | Noted: 2020-01-01

## 2020-09-14 PROBLEM — I50.9 ACUTE CONGESTIVE HEART FAILURE: Status: ACTIVE | Noted: 2020-01-01

## 2020-09-14 PROBLEM — Z71.89 ACP (ADVANCE CARE PLANNING): Status: ACTIVE | Noted: 2020-01-01

## 2020-09-14 PROBLEM — J96.01 ACUTE RESPIRATORY FAILURE WITH HYPOXIA: Status: ACTIVE | Noted: 2020-01-01

## 2020-09-18 PROBLEM — I35.0 AORTIC STENOSIS, MODERATE: Status: ACTIVE | Noted: 2020-01-01

## 2020-11-13 PROBLEM — J96.20 ACUTE ON CHRONIC RESPIRATORY FAILURE: Status: ACTIVE | Noted: 2020-01-01

## 2020-11-13 PROBLEM — S72.002A FRACTURE OF FEMORAL NECK, LEFT: Status: ACTIVE | Noted: 2020-01-01

## 2020-11-13 PROBLEM — S72.90XA FEMUR FRACTURE: Status: ACTIVE | Noted: 2020-01-01

## 2020-11-14 PROBLEM — E87.5 HYPERKALEMIA: Status: ACTIVE | Noted: 2020-01-01
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
## 2020-11-17 PROBLEM — I48.91 ATRIAL FIBRILLATION, RAPID: Status: ACTIVE | Noted: 2020-01-01

## 2020-11-19 PROBLEM — K59.00 CONSTIPATION: Status: ACTIVE | Noted: 2020-01-01

## 2020-11-19 PROBLEM — Z75.8 DISCHARGE PLANNING ISSUES: Status: ACTIVE | Noted: 2020-01-01

## 2021-01-20 PROBLEM — J18.9 PNEUMONIA: Status: ACTIVE | Noted: 2021-01-01

## 2021-01-20 PROBLEM — N18.9 ACUTE ON CHRONIC KIDNEY FAILURE: Status: ACTIVE | Noted: 2021-01-01

## 2021-01-20 PROBLEM — G93.41 ACUTE METABOLIC ENCEPHALOPATHY: Status: ACTIVE | Noted: 2021-01-01

## 2021-01-20 PROBLEM — N17.9 ACUTE ON CHRONIC KIDNEY FAILURE: Status: ACTIVE | Noted: 2021-01-01

## 2021-01-20 PROBLEM — R79.89 ELEVATED TROPONIN: Status: ACTIVE | Noted: 2021-01-01

## 2021-01-21 PROBLEM — I95.9 HYPOTENSION: Status: ACTIVE | Noted: 2021-01-01

## 2021-03-19 PROBLEM — F03.918 DEMENTIA WITH BEHAVIORAL DISTURBANCE: Status: ACTIVE | Noted: 2021-01-01

## 2021-03-19 PROBLEM — R65.20 SEVERE SEPSIS: Status: ACTIVE | Noted: 2021-01-01

## 2021-03-19 PROBLEM — R10.9 ABDOMINAL PAIN: Status: ACTIVE | Noted: 2021-01-01

## 2021-03-19 PROBLEM — A41.9 SEVERE SEPSIS: Status: ACTIVE | Noted: 2021-01-01

## 2021-03-20 PROBLEM — E44.0 MODERATE MALNUTRITION: Status: ACTIVE | Noted: 2021-01-01

## 2021-03-20 PROBLEM — R10.9 ABDOMINAL PAIN: Status: RESOLVED | Noted: 2021-01-01 | Resolved: 2021-01-01

## 2021-04-09 NOTE — PLAN OF CARE
Tolerated injection without any distress.  Reviewed upcoming appointments.  Amb off unit independently by self.   
Yes